# Patient Record
Sex: FEMALE | Race: WHITE | URBAN - METROPOLITAN AREA
[De-identification: names, ages, dates, MRNs, and addresses within clinical notes are randomized per-mention and may not be internally consistent; named-entity substitution may affect disease eponyms.]

---

## 2023-09-24 ENCOUNTER — HOSPITAL ENCOUNTER (EMERGENCY)
Facility: HOSPITAL | Age: 88
Discharge: HOME | End: 2023-09-25
Attending: EMERGENCY MEDICINE
Payer: MEDICARE

## 2023-09-24 ENCOUNTER — APPOINTMENT (OUTPATIENT)
Dept: GENERAL RADIOLOGY | Facility: HOSPITAL | Age: 88
End: 2023-09-24
Attending: EMERGENCY MEDICINE
Payer: MEDICARE

## 2023-09-24 DIAGNOSIS — S42.92XA TRAUMATIC CLOSED DISPLACED FRACTURE OF LEFT SHOULDER WITH ANTERIOR DISLOCATION, INITIAL ENCOUNTER: Primary | ICD-10-CM

## 2023-09-24 PROCEDURE — 99285 EMERGENCY DEPT VISIT HI MDM: CPT

## 2023-09-24 PROCEDURE — 73030 X-RAY EXAM OF SHOULDER: CPT | Performed by: EMERGENCY MEDICINE

## 2023-09-24 PROCEDURE — 99152 MOD SED SAME PHYS/QHP 5/>YRS: CPT

## 2023-09-24 PROCEDURE — 96374 THER/PROPH/DIAG INJ IV PUSH: CPT

## 2023-09-24 PROCEDURE — 23650 CLTX SHO DSLC W/MNPJ WO ANES: CPT

## 2023-09-24 PROCEDURE — 99153 MOD SED SAME PHYS/QHP EA: CPT

## 2023-09-24 PROCEDURE — 96376 TX/PRO/DX INJ SAME DRUG ADON: CPT

## 2023-09-24 RX ORDER — ETOMIDATE 2 MG/ML
0.1 INJECTION INTRAVENOUS ONCE
Status: COMPLETED | OUTPATIENT
Start: 2023-09-24 | End: 2023-09-24

## 2023-09-24 RX ORDER — ACETAMINOPHEN 325 MG/1
650 TABLET ORAL ONCE
Status: DISCONTINUED | OUTPATIENT
Start: 2023-09-24 | End: 2023-09-24

## 2023-09-24 RX ORDER — MORPHINE SULFATE 2 MG/ML
INJECTION, SOLUTION INTRAMUSCULAR; INTRAVENOUS
Status: COMPLETED | OUTPATIENT
Start: 2023-09-24 | End: 2023-09-24

## 2023-09-24 RX ORDER — ACETAMINOPHEN 160 MG/5ML
500 SOLUTION ORAL ONCE
Status: DISCONTINUED | OUTPATIENT
Start: 2023-09-24 | End: 2023-09-24

## 2023-09-24 RX ORDER — MORPHINE SULFATE 2 MG/ML
INJECTION, SOLUTION INTRAMUSCULAR; INTRAVENOUS
Status: COMPLETED
Start: 2023-09-24 | End: 2023-09-24

## 2023-09-24 RX ORDER — MORPHINE SULFATE 4 MG/ML
4 INJECTION, SOLUTION INTRAMUSCULAR; INTRAVENOUS ONCE
Status: COMPLETED | OUTPATIENT
Start: 2023-09-24 | End: 2023-09-24

## 2023-09-24 RX ORDER — RISPERIDONE 1 MG/ML
0.25 SOLUTION ORAL NIGHTLY
COMMUNITY
Start: 2023-09-15

## 2023-09-25 ENCOUNTER — APPOINTMENT (OUTPATIENT)
Dept: CT IMAGING | Facility: HOSPITAL | Age: 88
End: 2023-09-25
Attending: EMERGENCY MEDICINE
Payer: MEDICARE

## 2023-09-25 ENCOUNTER — APPOINTMENT (OUTPATIENT)
Dept: GENERAL RADIOLOGY | Facility: HOSPITAL | Age: 88
End: 2023-09-25
Attending: EMERGENCY MEDICINE
Payer: MEDICARE

## 2023-09-25 VITALS
HEART RATE: 75 BPM | DIASTOLIC BLOOD PRESSURE: 88 MMHG | HEIGHT: 58 IN | RESPIRATION RATE: 15 BRPM | SYSTOLIC BLOOD PRESSURE: 154 MMHG | WEIGHT: 120 LBS | OXYGEN SATURATION: 95 % | TEMPERATURE: 98 F | BODY MASS INDEX: 25.19 KG/M2

## 2023-09-25 LAB
ANION GAP SERPL CALC-SCNC: 9 MMOL/L (ref 0–18)
ANTIBODY SCREEN: NEGATIVE
BASOPHILS # BLD AUTO: 0.02 X10(3) UL (ref 0–0.2)
BASOPHILS NFR BLD AUTO: 0.2 %
BUN BLD-MCNC: 27 MG/DL (ref 7–18)
BUN/CREAT SERPL: 28.1 (ref 10–20)
CALCIUM BLD-MCNC: 9.1 MG/DL (ref 8.5–10.1)
CHLORIDE SERPL-SCNC: 106 MMOL/L (ref 98–112)
CO2 SERPL-SCNC: 23 MMOL/L (ref 21–32)
CREAT BLD-MCNC: 0.96 MG/DL
DEPRECATED RDW RBC AUTO: 45 FL (ref 35.1–46.3)
EGFRCR SERPLBLD CKD-EPI 2021: 55 ML/MIN/1.73M2 (ref 60–?)
EOSINOPHIL # BLD AUTO: 0.01 X10(3) UL (ref 0–0.7)
EOSINOPHIL NFR BLD AUTO: 0.1 %
ERYTHROCYTE [DISTWIDTH] IN BLOOD BY AUTOMATED COUNT: 14.1 % (ref 11–15)
GLUCOSE BLD-MCNC: 159 MG/DL (ref 70–99)
HCT VFR BLD AUTO: 44.1 %
HGB BLD-MCNC: 14.5 G/DL
IMM GRANULOCYTES # BLD AUTO: 0.02 X10(3) UL (ref 0–1)
IMM GRANULOCYTES NFR BLD: 0.2 %
LYMPHOCYTES # BLD AUTO: 1.28 X10(3) UL (ref 1–4)
LYMPHOCYTES NFR BLD AUTO: 10 %
MCH RBC QN AUTO: 28.9 PG (ref 26–34)
MCHC RBC AUTO-ENTMCNC: 32.9 G/DL (ref 31–37)
MCV RBC AUTO: 87.8 FL
MONOCYTES # BLD AUTO: 0.68 X10(3) UL (ref 0.1–1)
MONOCYTES NFR BLD AUTO: 5.3 %
NEUTROPHILS # BLD AUTO: 10.76 X10 (3) UL (ref 1.5–7.7)
NEUTROPHILS # BLD AUTO: 10.76 X10(3) UL (ref 1.5–7.7)
NEUTROPHILS NFR BLD AUTO: 84.2 %
OSMOLALITY SERPL CALC.SUM OF ELEC: 294 MOSM/KG (ref 275–295)
PLATELET # BLD AUTO: 297 10(3)UL (ref 150–450)
POTASSIUM SERPL-SCNC: 3.4 MMOL/L (ref 3.5–5.1)
RBC # BLD AUTO: 5.02 X10(6)UL
RH BLOOD TYPE: POSITIVE
SODIUM SERPL-SCNC: 138 MMOL/L (ref 136–145)
WBC # BLD AUTO: 12.8 X10(3) UL (ref 4–11)

## 2023-09-25 PROCEDURE — 80048 BASIC METABOLIC PNL TOTAL CA: CPT | Performed by: EMERGENCY MEDICINE

## 2023-09-25 PROCEDURE — 73030 X-RAY EXAM OF SHOULDER: CPT | Performed by: EMERGENCY MEDICINE

## 2023-09-25 PROCEDURE — 86901 BLOOD TYPING SEROLOGIC RH(D): CPT | Performed by: EMERGENCY MEDICINE

## 2023-09-25 PROCEDURE — 86850 RBC ANTIBODY SCREEN: CPT | Performed by: EMERGENCY MEDICINE

## 2023-09-25 PROCEDURE — 85025 COMPLETE CBC W/AUTO DIFF WBC: CPT | Performed by: EMERGENCY MEDICINE

## 2023-09-25 PROCEDURE — 86900 BLOOD TYPING SEROLOGIC ABO: CPT | Performed by: EMERGENCY MEDICINE

## 2023-09-25 PROCEDURE — 70450 CT HEAD/BRAIN W/O DYE: CPT | Performed by: EMERGENCY MEDICINE

## 2023-09-25 NOTE — ED INITIAL ASSESSMENT (HPI)
Dementia Patient arrives via home complaining of left shoulder pain that started tonight after a fall. No head, neck, or back pain. No blood thinners. Patient experiencing a lot of left shoulder pain. Family at bedside.

## 2023-09-25 NOTE — ED QUICK NOTES
The patient has successfully recovered from procedural conscious sedation used for left shoulder dislocation/orthopedic reduction. Family remains at the bedside. The patient is alert and oriented to self with baseline confusion to situation with known dementia diagnosis. The patient was offered a purewick female external catheter but was unable to tolerate it.

## 2023-09-27 ENCOUNTER — TELEPHONE (OUTPATIENT)
Dept: ORTHOPEDICS CLINIC | Facility: CLINIC | Age: 88
End: 2023-09-27

## 2023-09-27 NOTE — TELEPHONE ENCOUNTER
Called patient using language line. The patient's daughter dismissed the  to speak with me herself. Daughter stated that patient was already seen today by the doctor that she was instructed to follow up with on her AVS (Dr. Dorcas Pearson).

## 2025-06-23 ENCOUNTER — APPOINTMENT (OUTPATIENT)
Dept: CT IMAGING | Facility: HOSPITAL | Age: OVER 89
End: 2025-06-23
Attending: EMERGENCY MEDICINE
Payer: MEDICARE

## 2025-06-23 ENCOUNTER — HOSPITAL ENCOUNTER (OUTPATIENT)
Facility: HOSPITAL | Age: OVER 89
Setting detail: OBSERVATION
Discharge: HOME | End: 2025-06-24
Attending: EMERGENCY MEDICINE | Admitting: HOSPITALIST
Payer: MEDICARE

## 2025-06-23 DIAGNOSIS — C50.919 ADENOCARCINOMA OF BREAST METASTATIC TO LIVER, UNSPECIFIED LATERALITY (HCC): ICD-10-CM

## 2025-06-23 DIAGNOSIS — E86.0 DEHYDRATION: Primary | ICD-10-CM

## 2025-06-23 DIAGNOSIS — C78.7 ADENOCARCINOMA OF BREAST METASTATIC TO LIVER, UNSPECIFIED LATERALITY (HCC): ICD-10-CM

## 2025-06-23 LAB
ALBUMIN SERPL-MCNC: 4.1 G/DL (ref 3.2–4.8)
ALBUMIN/GLOB SERPL: 1.5 {RATIO} (ref 1–2)
ALP LIVER SERPL-CCNC: 869 U/L (ref 55–142)
ALT SERPL-CCNC: 149 U/L (ref 10–49)
ANION GAP SERPL CALC-SCNC: 14 MMOL/L (ref 0–18)
AST SERPL-CCNC: 531 U/L (ref ?–34)
BILIRUB SERPL-MCNC: 1.4 MG/DL (ref 0.2–0.9)
BUN BLD-MCNC: 51 MG/DL (ref 9–23)
BUN/CREAT SERPL: 38.1 (ref 10–20)
CALCIUM BLD-MCNC: 10.5 MG/DL (ref 8.7–10.4)
CHLORIDE SERPL-SCNC: 102 MMOL/L (ref 98–112)
CO2 SERPL-SCNC: 23 MMOL/L (ref 21–32)
CREAT BLD-MCNC: 1.34 MG/DL (ref 0.55–1.02)
EGFRCR SERPLBLD CKD-EPI 2021: 37 ML/MIN/1.73M2 (ref 60–?)
GLOBULIN PLAS-MCNC: 2.7 G/DL (ref 2–3.5)
GLUCOSE BLD-MCNC: 117 MG/DL (ref 70–99)
LIPASE SERPL-CCNC: 53 U/L (ref 12–53)
OSMOLALITY SERPL CALC.SUM OF ELEC: 303 MOSM/KG (ref 275–295)
POTASSIUM SERPL-SCNC: 4.8 MMOL/L (ref 3.5–5.1)
PROT SERPL-MCNC: 6.8 G/DL (ref 5.7–8.2)
SODIUM SERPL-SCNC: 139 MMOL/L (ref 136–145)

## 2025-06-23 PROCEDURE — 74177 CT ABD & PELVIS W/CONTRAST: CPT | Performed by: STUDENT IN AN ORGANIZED HEALTH CARE EDUCATION/TRAINING PROGRAM

## 2025-06-23 PROCEDURE — 83690 ASSAY OF LIPASE: CPT | Performed by: EMERGENCY MEDICINE

## 2025-06-23 PROCEDURE — 85025 COMPLETE CBC W/AUTO DIFF WBC: CPT

## 2025-06-23 PROCEDURE — 99285 EMERGENCY DEPT VISIT HI MDM: CPT

## 2025-06-23 PROCEDURE — 85060 BLOOD SMEAR INTERPRETATION: CPT

## 2025-06-23 PROCEDURE — 83690 ASSAY OF LIPASE: CPT

## 2025-06-23 PROCEDURE — 80053 COMPREHEN METABOLIC PANEL: CPT

## 2025-06-23 PROCEDURE — 85025 COMPLETE CBC W/AUTO DIFF WBC: CPT | Performed by: EMERGENCY MEDICINE

## 2025-06-23 PROCEDURE — 85060 BLOOD SMEAR INTERPRETATION: CPT | Performed by: EMERGENCY MEDICINE

## 2025-06-23 PROCEDURE — 80053 COMPREHEN METABOLIC PANEL: CPT | Performed by: EMERGENCY MEDICINE

## 2025-06-23 PROCEDURE — 96360 HYDRATION IV INFUSION INIT: CPT

## 2025-06-23 RX ORDER — MIRTAZAPINE 7.5 MG/1
7.5 TABLET, FILM COATED ORAL NIGHTLY
COMMUNITY

## 2025-06-24 VITALS
OXYGEN SATURATION: 99 % | WEIGHT: 120 LBS | RESPIRATION RATE: 16 BRPM | SYSTOLIC BLOOD PRESSURE: 118 MMHG | HEIGHT: 60 IN | TEMPERATURE: 97 F | DIASTOLIC BLOOD PRESSURE: 87 MMHG | BODY MASS INDEX: 23.56 KG/M2 | HEART RATE: 58 BPM

## 2025-06-24 PROBLEM — C78.7: Status: ACTIVE | Noted: 2025-06-24

## 2025-06-24 PROBLEM — C50.919: Status: ACTIVE | Noted: 2025-06-24

## 2025-06-24 PROBLEM — E86.0 DEHYDRATION: Status: ACTIVE | Noted: 2025-06-24

## 2025-06-24 LAB
BASOPHILS # BLD AUTO: 0.03 X10(3) UL (ref 0–0.2)
BASOPHILS NFR BLD AUTO: 0.3 %
BILIRUB UR QL: NEGATIVE
CLARITY UR: CLEAR
DEPRECATED RDW RBC AUTO: 47.6 FL (ref 35.1–46.3)
EOSINOPHIL # BLD AUTO: 0 X10(3) UL (ref 0–0.7)
EOSINOPHIL NFR BLD AUTO: 0 %
ERYTHROCYTE [DISTWIDTH] IN BLOOD BY AUTOMATED COUNT: 15.7 % (ref 11–15)
GLUCOSE UR-MCNC: NORMAL MG/DL
HCT VFR BLD AUTO: 49.2 % (ref 35–48)
HGB BLD-MCNC: 16.8 G/DL (ref 12–16)
HYALINE CASTS #/AREA URNS AUTO: PRESENT /LPF
IMM GRANULOCYTES # BLD AUTO: 0.06 X10(3) UL (ref 0–1)
IMM GRANULOCYTES NFR BLD: 0.6 %
KETONES UR-MCNC: NEGATIVE MG/DL
LEUKOCYTE ESTERASE UR QL STRIP.AUTO: 75
LYMPHOCYTES # BLD AUTO: 2.33 X10(3) UL (ref 1–4)
LYMPHOCYTES NFR BLD AUTO: 23.9 %
MCH RBC QN AUTO: 29.5 PG (ref 26–34)
MCHC RBC AUTO-ENTMCNC: 34.1 G/DL (ref 31–37)
MCV RBC AUTO: 86.3 FL (ref 80–100)
MONOCYTES # BLD AUTO: 1.2 X10(3) UL (ref 0.1–1)
MONOCYTES NFR BLD AUTO: 12.3 %
NEUTROPHILS # BLD AUTO: 6.12 X10 (3) UL (ref 1.5–7.7)
NEUTROPHILS # BLD AUTO: 6.12 X10(3) UL (ref 1.5–7.7)
NEUTROPHILS NFR BLD AUTO: 62.9 %
NITRITE UR QL STRIP.AUTO: NEGATIVE
PH UR: 6 [PH] (ref 5–8)
PLATELET # BLD AUTO: 335 10(3)UL (ref 150–450)
PROT UR-MCNC: NEGATIVE MG/DL
RBC # BLD AUTO: 5.7 X10(6)UL (ref 3.8–5.3)
SP GR UR STRIP: 1.02 (ref 1–1.03)
UROBILINOGEN UR STRIP-ACNC: NORMAL
WBC # BLD AUTO: 9.7 X10(3) UL (ref 4–11)

## 2025-06-24 PROCEDURE — 81001 URINALYSIS AUTO W/SCOPE: CPT | Performed by: EMERGENCY MEDICINE

## 2025-06-24 PROCEDURE — 87086 URINE CULTURE/COLONY COUNT: CPT | Performed by: EMERGENCY MEDICINE

## 2025-06-24 PROCEDURE — 96361 HYDRATE IV INFUSION ADD-ON: CPT

## 2025-06-24 RX ORDER — ACETAMINOPHEN 325 MG/1
650 TABLET ORAL EVERY 6 HOURS PRN
Status: DISCONTINUED | OUTPATIENT
Start: 2025-06-24 | End: 2025-06-24

## 2025-06-24 RX ORDER — METOCLOPRAMIDE HYDROCHLORIDE 5 MG/ML
10 INJECTION INTRAMUSCULAR; INTRAVENOUS EVERY 6 HOURS PRN
Status: DISCONTINUED | OUTPATIENT
Start: 2025-06-24 | End: 2025-06-24

## 2025-06-24 RX ORDER — ONDANSETRON 2 MG/ML
4 INJECTION INTRAMUSCULAR; INTRAVENOUS EVERY 6 HOURS PRN
Status: DISCONTINUED | OUTPATIENT
Start: 2025-06-24 | End: 2025-06-24

## 2025-06-24 RX ORDER — HYDRALAZINE HYDROCHLORIDE 20 MG/ML
10 INJECTION INTRAMUSCULAR; INTRAVENOUS EVERY 4 HOURS PRN
Status: DISCONTINUED | OUTPATIENT
Start: 2025-06-24 | End: 2025-06-24

## 2025-06-24 RX ORDER — PANTOPRAZOLE SODIUM 40 MG/1
40 TABLET, DELAYED RELEASE ORAL
Status: DISCONTINUED | OUTPATIENT
Start: 2025-06-24 | End: 2025-06-24

## 2025-06-24 RX ORDER — HEPARIN SODIUM 5000 [USP'U]/ML
5000 INJECTION, SOLUTION INTRAVENOUS; SUBCUTANEOUS EVERY 12 HOURS
Status: DISCONTINUED | OUTPATIENT
Start: 2025-06-24 | End: 2025-06-24

## 2025-06-24 RX ORDER — MORPHINE SULFATE 4 MG/ML
4 INJECTION, SOLUTION INTRAMUSCULAR; INTRAVENOUS EVERY 2 HOUR PRN
Status: DISCONTINUED | OUTPATIENT
Start: 2025-06-24 | End: 2025-06-24

## 2025-06-24 RX ORDER — MORPHINE SULFATE 2 MG/ML
2 INJECTION, SOLUTION INTRAMUSCULAR; INTRAVENOUS EVERY 2 HOUR PRN
Status: DISCONTINUED | OUTPATIENT
Start: 2025-06-24 | End: 2025-06-24

## 2025-06-24 RX ORDER — RISPERIDONE 1 MG/ML
1 SOLUTION ORAL 2 TIMES DAILY
Status: DISCONTINUED | OUTPATIENT
Start: 2025-06-24 | End: 2025-06-24

## 2025-06-24 RX ORDER — DEXTROSE MONOHYDRATE AND SODIUM CHLORIDE 5; .45 G/100ML; G/100ML
INJECTION, SOLUTION INTRAVENOUS CONTINUOUS
Status: DISCONTINUED | OUTPATIENT
Start: 2025-06-24 | End: 2025-06-24

## 2025-06-24 RX ORDER — MIRTAZAPINE 7.5 MG/1
7.5 TABLET, FILM COATED ORAL NIGHTLY
Status: DISCONTINUED | OUTPATIENT
Start: 2025-06-24 | End: 2025-06-24

## 2025-06-24 RX ORDER — ZOLPIDEM TARTRATE 5 MG/1
5 TABLET ORAL NIGHTLY PRN
Status: DISCONTINUED | OUTPATIENT
Start: 2025-06-24 | End: 2025-06-24

## 2025-06-24 RX ORDER — SODIUM CHLORIDE 9 MG/ML
125 INJECTION, SOLUTION INTRAVENOUS CONTINUOUS
Status: DISCONTINUED | OUTPATIENT
Start: 2025-06-24 | End: 2025-06-24

## 2025-06-24 NOTE — DISCHARGE SUMMARY
[facility]  part of [facility]    Discharge Summary    Hilary Candelario Patient Status:  Observation    10/8/1929 MRN [id number]   Location [facility] 4W/SW/SE Attending Shelib Grant MD   Hosp Day # 0 PCP Wei Lerma MD     Date of Admission: 2025   Date of Discharge: 2025    Hospital Discharge Diagnoses: Acute dehydration    Lace+ Score: 43  59-90 High Risk  29-58 Medium Risk  0-28   Low Risk.    TCM Follow-Up Recommendation:  LACE > 58: High Risk of readmission after discharge from the hospital.        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  Describes the onset as gradual with progressive worsening with no clear aggravating or relieving factors.  Patient does appear to be some discomfort, complaining of right-sided abdominal pain.  CT scan done in ER shows evidence of likely breast cancer metastatic in nature with significant liver mets       Discharge Physical Exam:   Not done    Hospital Course:     Metastatic breast cancer  Status discussed with family, will opt comfort measures, hospice consult in place  Family is not interested in hospice at this time.      Acute kidney injury  Currently on fluids, avoid nephrotoxic medications, discontinue fluids if and when patient to be transferred to hospice     Acute transaminitis/hepatitis  Likely related to obstruction from liver mets, hold off on further investigations at this time.     Benign  hypertension  IV hydralazine as needed.     Prophylaxis  None at this time, holding heparin since patient is likely to end up in hospice care.     CODE STATUS  DNR comfort    Complications: none        Pending Labs       Order Current Status    Urine Culture, Routine In process            Discharge Plan:   Discharge Condition: Stable    Current Discharge Medication List        Home Meds - Unchanged    Details   mirtazapine 7.5 MG Oral Tab Take 1 tablet (7.5 mg total) by mouth nightly.      risperiDONE 1 MG/ML Oral Solution Take 0.25 mL (0.25 mg total) by mouth nightly.      amLODIPine 5 MG Oral Tab Take 1.5 tablets (7.5 mg total) by mouth in the morning.                 Discharge Diet: As tolerated    Discharge Activity: As tolerated       Discharge Medications        CONTINUE taking these medications        Instructions Prescription details   amLODIPine 5 MG Tabs  Commonly known as: Norvasc      Take 1.5 tablets (7.5 mg total) by mouth in the morning.   Refills: 0     mirtazapine 7.5 MG Tabs  Commonly known as: Remeron      Take 1 tablet (7.5 mg total) by mouth nightly.   Refills: 0     risperiDONE 1 MG/ML Soln  Commonly known as: RisperDAL      Take 0.25 mL (0.25 mg total) by mouth nightly.   Refills: 0              Follow up:       Follow up Labs and imaging:         Time spent:  > 30 minutes    MAHIN MEAD MD  6/24/2025

## 2025-06-24 NOTE — ED PROVIDER NOTES
Patient Seen in: [facility] Emergency Department        History  Chief Complaint   Patient presents with    Dehydration     Stated Complaint: ams, dehydration    Subjective:   HPI            This a 89-year-old female who arrives with her son for dehydration and decreased p.o. intake x 10 days.  Patient more weak and more confused than normal.  No vomiting, diarrhea or fevers.  She has been complaining of right sided abdominal pain.  Positive orange urine.      Objective:     Past Medical History:    Dementia (HCC)              History reviewed. No pertinent surgical history.             Social History     Socioeconomic History    Marital status:    Tobacco Use    Smoking status: Never    Smokeless tobacco: Never   Vaping Use    Vaping status: Never Used   Substance and Sexual Activity    Alcohol use: Never    Drug use: Never     Social Drivers of Health     Food Insecurity: No Food Insecurity (11/27/2024)    Received from [facility]    Hunger Vital Sign     Worried About Running Out of Food in the Last Year: Never true     Ran Out of Food in the Last Year: Never true   Transportation Needs: No Transportation Needs (11/27/2024)    Received from [facility]    PRAPARE - Transportation     Lack of Transportation (Medical): No     Lack of Transportation (Non-Medical): No   Housing Stability: Unknown (11/27/2024)    Received from [facility]    Housing Stability Vital Sign     Unable to Pay for Housing in the Last Year: No     Homeless in the Last Year: No                                Physical Exam    ED Triage Vitals [06/23/25 1929]   /86   Pulse 88   Resp 17   Temp 97.3 °F (36.3 °C)   Temp src Temporal   SpO2 95 %   O2 Device None (Room air)       Current Vitals:   Vital Signs  BP: (!) 135/98  Pulse: 59  Resp: 21  Temp: 97.3 °F (36.3 °C)  Temp src: Temporal  MAP (mmHg): (!) 111    Oxygen Therapy  SpO2: 95 %  O2 Device: None (Room  air)            Physical Exam  GENERAL: No acute distress, awake and alert  HEENT: EOMI, PERRL, MMM  Neck: supple  CV: RRR, no murmurs  Resp: CTAB, no wheezes or retractions  Ab: soft, TTP in RUQ with palpable mass.   Extremities: FROM of all extremities, no edema or deformities  Neuro: at baseline  SKIN: warm, dry, no rashes          ED Course  Labs Reviewed   CBC WITH DIFFERENTIAL WITH PLATELET - Abnormal; Notable for the following components:       Result Value    RBC 5.70 (*)     HGB 16.8 (*)     HCT 49.2 (*)     RDW-SD 47.6 (*)     RDW 15.7 (*)     All other components within normal limits   COMP METABOLIC PANEL (14) - Abnormal; Notable for the following components:    Glucose 117 (*)     BUN 51 (*)     Creatinine 1.34 (*)     BUN/CREA Ratio 38.1 (*)     Calcium, Total 10.5 (*)     Calculated Osmolality 303 (*)     eGFR-Cr 37 (*)      (*)      (*)     Alkaline Phosphatase 869 (*)     Bilirubin, Total 1.4 (*)     All other components within normal limits   URINALYSIS WITH CULTURE REFLEX - Abnormal; Notable for the following components:    Blood Urine Trace (*)     Leukocyte Esterase Urine 75 (*)     Hyaline Casts Present (*)     All other components within normal limits   LIPASE - Normal   MD BLOOD SMEAR CONSULT   RAINBOW DRAW LAVENDER   RAINBOW DRAW LIGHT GREEN   RAINBOW DRAW BLUE   URINE CULTURE, ROUTINE                     [facility]     Admission disposition: 6/24/2025  1:23 AM           Medical Decision Making  Pt with signs/symptoms of dehydration with weakness/decreased PO intake.  Started on IVF  Labs remarkable for elevated LFTs  D/w son the CT results. Agreeable to observational stay for fluid hydration and hospice consult.     Amount and/or Complexity of Data Reviewed  Independent Historian:      Details: Son provides history  External Data Reviewed: labs.     Details: Labs 11/2024 reviewed  Labs: ordered.  Radiology: ordered.     Details: CT ABDOMEN/PELVIS (with IV  contrast)    IMPRESSION:    Right breast mass with associated extensive nodularity and skin thickening, highly suspicious for breast carcinoma.    Moderate right pleural effusion.    Numerous liver masses, consistent with metastatic disease.    1 cm left adrenal nodule.    Small amount of free fluid in pelvis and adjacent to liver.  No free air.    No bowel obstruction.    Normal appendix.      Report finalized at 12:49 AM ET    Dr. López Rascon MD    Discussion of management or test interpretation with external provider(s): Dr Burr notified for admission  Hospice consult placed for admission    Risk  Decision regarding hospitalization.        Disposition and Plan     Clinical Impression:  1. Dehydration    2. 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  We recommend that you schedule follow up care with a primary care provider within the next three months to obtain basic health screening including reassessment of your blood pressure.      Medications Prescribed:  Current Discharge Medication List                Supplementary Documentation:         Hospital Problems       Present on Admission  Date Reviewed: 10/7/2021          ICD-10-CM Noted POA    * (Principal) Dehydration E86.0 6/24/2025 Unknown

## 2025-06-24 NOTE — HOSPICE RN NOTE
Hospice referral received. [place] complete. [facility] will follow up with family to set up informational meeting.    Thank you for your referral of this patient to our hospice services.       Addendum: Informational scheduled for 1030am today.    [facility] RN visit for follow up on Hospice consult order. Met with daughter and son at bedside.  Discussed information on the Hospice philosophy, Medicare benefit, levels of care, team approach and focus on comfort with questions and concerns addressed.   They expressed the desire for discharge home today/soonest possbile and requested follow up with hospice later on once patient returns home. The family would like to wait until tomorrow and later this week to sign on to hospice. Plan of Care reviewed with RN Gamal, EDU Sommers and MD Grant. Informational meeting complete. Open for [facility] follow up tomorrow in the field at home to provide support in the home setting per family request. Provided 855# and Hospice Care Companion.       Marilu MCINTOSHN, RN, CHPN  Transitional Nurse Liaison  [facility]  [phone] (After-hours)

## 2025-06-24 NOTE — ED INITIAL ASSESSMENT (HPI)
Pt presented to ED c/o dehydration x1.5 weeks. Pt's family reported they spoke to her physician and were recommended to come to the ED for IV fluids for increased confusion and \"orange\" pee. Pt has dementia. Pt's family reported that the pt has been c/o RLQ pain starting today. Denied nausea, vomiting, diarrhea, chills, fevers, urinary symptoms. Pt's family reported the pt has not been eating and drinking for the past 1.5 weeks.

## 2025-06-24 NOTE — H&P
[facility]  part of [facility]    History & Physical    Hilary Candelario Patient Status:  Emergency    10/8/1929 MRN [id number]   Location [facility] EMERGENCY DEPARTMENT Attending Bev Lowery MD   Hosp Day # 0 PCP Wei Lerma MD     Date:  2025  Date of Admission:  2025    Chief Complaint:  Chief Complaint   Patient presents with    Dehydration       History of Present Illness:  Hilary Candelario is a(n) 89 year old female, with past medical history significant for hypertension and dementia was brought in by family for poor oral intake, increasing confusion and lethargy over the past 10 days.  Describes the onset as gradual with progressive worsening with no clear aggravating or relieving factors.  Patient does appear to be some discomfort, complaining of right-sided abdominal pain.  CT scan done in ER shows evidence of likely breast cancer metastatic in nature with significant liver mets    History:  Past Medical History[1] Hypertension, dementia  Past Surgical History[2]  Family History[3] No sick contacts   reports that she has never smoked. She has never used smokeless tobacco. She reports that she does not drink alcohol and does not use drugs.    Allergies:  Allergies[4]    Home Medications:  Prior to Admission Medications   Prescriptions Last Dose Informant Patient Reported? Taking?   amLODIPine 5 MG Oral Tab   Yes No   Sig: Take 7.5 mg by mouth daily.   mirtazapine 7.5 MG Oral Tab   Yes Yes   Sig: Take 1 tablet (7.5 mg total) by mouth nightly.   risperiDONE 1 MG/ML Oral Solution   Yes No   Sig: Take 0.25 mL (0.25 mg total) by mouth nightly.      Facility-Administered Medications: None       Review of Systems:  Unable to obtain secondary to patient's current mental status    Physical Exam:  Temp:  [97.3 °F (36.3 °C)-97.8 °F (36.6 °C)] 97.8 °F (36.6 °C)  Pulse:  [59-89] 62  Resp:  [17-22] 20  BP: (135-154)/(72-98) 141/72  SpO2:  [95 %-98 %] 97 %    General:  Drowsy, confused  Diffuse skin problem: Metastatic breast cancer on the right with skin lesions  Eye:  Pupils are equal, round and reactive to light, extraocular movements are intact, Normal conjunctiva.  HENT:  Normocephalic, oral mucosa is moist.  Head:  Normocephalic, atraumatic.  Neck:  Supple, non-tender, no carotid bruit, no jugular venous distention, no lymphadenopathy, no thyromegaly.  Respiratory:  Lungs are clear to auscultation, respirations are non-labored, breath sounds are equal, symmetrical chest wall expansion.  Cardiovascular:  Normal rate, regular rhythm, no murmur, no edema.  Gastrointestinal:  Soft, non-tender, non-distended, normal bowel sounds, no organomegaly.  Lymphatics:  No lymphadenopathy neck, axilla, groin.  Musculoskeletal: Normal range of motion.  normal strength.  Feet:  Normal pulses.  Neurologic: Somnolent, confused, no focal deficits, cranial nerves II-XII are grossly intact.  Cognition and Speech: Poor content  Psychiatric: Demented      Laboratory Data:   Lab Results   Component Value Date    WBC 9.7 06/23/2025    HGB 16.8 06/23/2025    HCT 49.2 06/23/2025    .0 06/23/2025    CREATSERUM 1.34 06/23/2025    BUN 51 06/23/2025     06/23/2025    K 4.8 06/23/2025     06/23/2025    CO2 23.0 06/23/2025     06/23/2025    CA 10.5 06/23/2025    ALB 4.1 06/23/2025    ALKPHO 869 06/23/2025    BILT 1.4 06/23/2025    TP 6.8 06/23/2025     06/23/2025     06/23/2025    LIP 53 06/23/2025       Imaging:  No results found.     Assessment and Plan:    Metastatic breast cancer  Status discussed with family, will opt comfort measures, hospice consult in place    Acute kidney injury  Currently on fluids, avoid nephrotoxic medications, discontinue fluids if and when patient to be transferred to hospice    Acute transaminitis/hepatitis  Likely related to obstruction from liver mets, hold off on further investigations at this time.    Benign hypertension  IV  hydralazine as needed.    Prophylaxis  None at this time, holding heparin since patient is likely to end up in hospice care.    CODE STATUS  DNR comfort    Primary care physician  Wei Lerma MD    MDM: High, acute illness/severe exacerbation of chronic illness posing threat to life.  IV medications requiring close inpatient monitoring  60 minutes spent on this admission - examining patient, obtaining history, reviewing previous medical records, going over test results/imaging and discussing plan of care. All questions answered.     Disposition  Clinical course will dictate outcome      EDISON SANTIAGO MD  6/24/2025  2:30 AM         [1]   Past Medical History:   Dementia (HCC)   [2] History reviewed. No pertinent surgical history.  [3] No family history on file.  [4]   Allergies  Allergen Reactions    Penicillins UNKNOWN

## 2025-06-24 NOTE — PLAN OF CARE
Hilary is alert and oriented x 2. Up x 2 to the bathroom. Family met with hospice, will make a decision at a later date, okay to discharge. IV removed.   Problem: Patient Centered Care  Goal: Patient preferences are identified and integrated in the patient's plan of care  Description: Interventions:  - What would you like us to know as we care for you?   - Provide timely, complete, and accurate information to patient/family  - Incorporate patient and family knowledge, values, beliefs, and cultural backgrounds into the planning and delivery of care  - Encourage patient/family to participate in care and decision-making at the level they choose  - Honor patient and family perspectives and choices  Outcome: Adequate for Discharge     Problem: Patient/Family Goals  Goal: Patient/Family Long Term Goal  Description: Patient's Long Term Goal:     Interventions:  - See additional Care Plan goals for specific interventions  Outcome: Adequate for Discharge  Goal: Patient/Family Short Term Goal  Description: Patient's Short Term Goal:     Interventions:  - See additional Care Plan goals for specific interventions  Outcome: Adequate for Discharge

## 2025-06-24 NOTE — CM/SW NOTE
SW received MDO for hospice. SW contacted [facility] liaison to inform of referral. [facility] to follow up with pt and family.    1100: SW was notified that pt is eager to go home and would like for hospice to follow at home and would like to sign consents for hospice at a later time.    PLAN:Home w/ family.    SW/CM to remain available for support and/or discharge planning.    [place]ie MSW, LSW [id number]

## (undated) NOTE — LETTER
Hospital Discharge Documentation      From: [facility] Hospitalist's Office  Phone: [phone]    Patient discharged time/date: 2025 12:43 PM  Patient discharge disposition:  Home or Self Care       Discharge Summary - D/C Summary        Discharge Summary signed by Shelbi Grant MD at 2025 11:14 AM  Version 1 of 1      Author: Shelbi Grant MD Service: Hospitalist Author Type: Physician    Filed: 2025 11:14 AM Date of Service: 2025 11:13 AM Status: Signed    : Shelbi Grant MD (Physician)         [facility]  part of [facility]    Discharge Summary    Hilary Candelario Patient Status:  Observation    10/8/1929 MRN [id number]   Location [facility] 4W/SW/SE Attending Shelbi Grant MD   Hosp Day # 0 PCP Wei Lerma MD     Date of Admission: 2025   Date of Discharge: 2025    Hospital Discharge Diagnoses: Acute dehydration    Lace+ Score: 43  59-90 High Risk  29-58 Medium Risk  0-28   Low Risk.    TCM Follow-Up Recommendation:  LACE > 58: High Risk of readmission after discharge from the hospital.        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  Describes the onset as gradual with progressive worsening with no clear aggravating or relieving factors.  Patient does appear to be some discomfort, complaining of right-sided abdominal pain.  CT scan done in ER shows evidence of likely breast cancer metastatic in nature with  significant liver mets       Discharge Physical Exam:   Not done    Hospital Course:     Metastatic breast cancer  Status discussed with family, will opt comfort measures, hospice consult in place  Family is not interested in hospice at this time.      Acute kidney injury  Currently on fluids, avoid nephrotoxic medications, discontinue fluids if and when patient to be transferred to hospice     Acute transaminitis/hepatitis  Likely related to obstruction from liver mets, hold off on further investigations at this time.     Benign hypertension  IV hydralazine as needed.     Prophylaxis  None at this time, holding heparin since patient is likely to end up in hospice care.     CODE STATUS  DNR comfort    Complications: none        Pending Labs       Order Current Status    Urine Culture, Routine In process            Discharge Plan:   Discharge Condition: Stable    Current Discharge Medication List        Home Meds - Unchanged    Details   mirtazapine 7.5 MG Oral Tab Take 1 tablet (7.5 mg total) by mouth nightly.      risperiDONE 1 MG/ML Oral Solution Take 0.25 mL (0.25 mg total) by mouth nightly.      amLODIPine 5 MG Oral Tab Take 1.5 tablets (7.5 mg total) by mouth in the morning.                 Discharge Diet: As tolerated    Discharge Activity: As tolerated       Discharge Medications        CONTINUE taking these medications        Instructions Prescription details   amLODIPine 5 MG Tabs  Commonly known as: Norvasc      Take 1.5 tablets (7.5 mg total) by mouth in the morning.   Refills: 0     mirtazapine 7.5 MG Tabs  Commonly known as: Remeron      Take 1 tablet (7.5 mg total) by mouth nightly.   Refills: 0     risperiDONE 1 MG/ML Soln  Commonly known as: RisperDAL      Take 0.25 mL (0.25 mg total) by mouth nightly.   Refills: 0              Follow up:       Follow up Labs and imaging:         Time spent:  > 30 minutes    SHELBI MEAD MD  6/24/2025      Electronically signed by Shelbi Mead MD on 6/24/2025  11:14 AM